# Patient Record
Sex: FEMALE | Race: WHITE
[De-identification: names, ages, dates, MRNs, and addresses within clinical notes are randomized per-mention and may not be internally consistent; named-entity substitution may affect disease eponyms.]

---

## 2018-02-09 ENCOUNTER — HOSPITAL ENCOUNTER (EMERGENCY)
Age: 73
Discharge: HOME | End: 2018-02-09

## 2018-02-09 ENCOUNTER — HOSPITAL ENCOUNTER (EMERGENCY)
Dept: HOSPITAL 91 - E/R | Age: 73
Discharge: HOME | End: 2018-02-09
Payer: COMMERCIAL

## 2018-02-09 DIAGNOSIS — I10: Primary | ICD-10-CM

## 2018-02-09 PROCEDURE — 99283 EMERGENCY DEPT VISIT LOW MDM: CPT

## 2018-02-09 RX ADMIN — NICARDIPINE HYDROCHLORIDE 1 MG: 30 CAPSULE ORAL at 14:30

## 2019-07-13 ENCOUNTER — HOSPITAL ENCOUNTER (EMERGENCY)
Dept: HOSPITAL 91 - E/R | Age: 74
Discharge: HOME | End: 2019-07-13
Payer: COMMERCIAL

## 2019-07-13 ENCOUNTER — HOSPITAL ENCOUNTER (EMERGENCY)
Dept: HOSPITAL 10 - E/R | Age: 74
Discharge: HOME | End: 2019-07-13
Payer: COMMERCIAL

## 2019-07-13 VITALS
BODY MASS INDEX: 36.18 KG/M2 | BODY MASS INDEX: 36.18 KG/M2 | HEIGHT: 65 IN | WEIGHT: 217.16 LBS | HEIGHT: 65 IN | WEIGHT: 217.16 LBS

## 2019-07-13 VITALS — SYSTOLIC BLOOD PRESSURE: 121 MMHG | HEART RATE: 99 BPM | RESPIRATION RATE: 20 BRPM | DIASTOLIC BLOOD PRESSURE: 72 MMHG

## 2019-07-13 DIAGNOSIS — I16.0: Primary | ICD-10-CM

## 2019-07-13 DIAGNOSIS — R40.2362: ICD-10-CM

## 2019-07-13 DIAGNOSIS — R40.2142: ICD-10-CM

## 2019-07-13 DIAGNOSIS — R40.2252: ICD-10-CM

## 2019-07-13 DIAGNOSIS — I10: ICD-10-CM

## 2019-07-13 LAB
ADD MAN DIFF?: NO
ANION GAP: 10 (ref 5–13)
BASOPHIL #: 0 10^3/UL (ref 0–0.1)
BASOPHILS %: 0.3 % (ref 0–2)
BLOOD UREA NITROGEN: 14 MG/DL (ref 7–20)
CALCIUM: 9.9 MG/DL (ref 8.4–10.2)
CARBON DIOXIDE: 27 MMOL/L (ref 21–31)
CHLORIDE: 106 MMOL/L (ref 97–110)
CREATININE: 0.8 MG/DL (ref 0.44–1)
EOSINOPHILS #: 0.2 10^3/UL (ref 0–0.5)
EOSINOPHILS %: 2.4 % (ref 0–7)
GLUCOSE: 114 MG/DL (ref 70–220)
HEMATOCRIT: 43 % (ref 37–47)
HEMOGLOBIN: 14.6 G/DL (ref 12–16)
IMMATURE GRANS #M: 0.02 10^3/UL (ref 0–0.03)
IMMATURE GRANS % (M): 0.2 % (ref 0–0.43)
LYMPHOCYTES #: 2.9 10^3/UL (ref 0.8–2.9)
LYMPHOCYTES %: 29.7 % (ref 15–51)
MEAN CORPUSCULAR HEMOGLOBIN: 30.7 PG (ref 29–33)
MEAN CORPUSCULAR HGB CONC: 34 G/DL (ref 32–37)
MEAN CORPUSCULAR VOLUME: 90.5 FL (ref 82–101)
MEAN PLATELET VOLUME: 11 FL (ref 7.4–10.4)
MONOCYTE #: 0.9 10^3/UL (ref 0.3–0.9)
MONOCYTES %: 8.6 % (ref 0–11)
NEUTROPHIL #: 5.8 10^3/UL (ref 1.6–7.5)
NEUTROPHILS %: 58.8 % (ref 39–77)
NUCLEATED RED BLOOD CELLS #: 0 10^3/UL (ref 0–0)
NUCLEATED RED BLOOD CELLS%: 0 /100WBC (ref 0–0)
PLATELET COUNT: 244 10^3/UL (ref 140–415)
POTASSIUM: 3.9 MMOL/L (ref 3.5–5.1)
RED BLOOD COUNT: 4.75 10^6/UL (ref 4.2–5.4)
RED CELL DISTRIBUTION WIDTH: 12.6 % (ref 11.5–14.5)
SODIUM: 143 MMOL/L (ref 135–144)
WHITE BLOOD COUNT: 9.9 10^3/UL (ref 4.8–10.8)

## 2019-07-13 PROCEDURE — 36415 COLL VENOUS BLD VENIPUNCTURE: CPT

## 2019-07-13 PROCEDURE — 80048 BASIC METABOLIC PNL TOTAL CA: CPT

## 2019-07-13 PROCEDURE — 70450 CT HEAD/BRAIN W/O DYE: CPT

## 2019-07-13 PROCEDURE — 85025 COMPLETE CBC W/AUTO DIFF WBC: CPT

## 2019-07-13 PROCEDURE — 99284 EMERGENCY DEPT VISIT MOD MDM: CPT

## 2019-07-13 RX ADMIN — NIFEDIPINE 1 MG: 30 TABLET, FILM COATED, EXTENDED RELEASE ORAL at 04:38

## 2019-07-13 NOTE — ERD
ER Documentation


Chief Complaint


Chief Complaint





htn at home, c/o headache. denies cp





HPI


The patient is a 74-year-old female, presenting to the ER because of high blood 


pressure with dizziness and headache intermittently for the last 5 days. She 


stopped taking her hypertensive medication a few months ago because she concern 


about cancer.  She denies syncope, near syncope, neck pain, chest pain, dyspnea,


abdominal pain, vomiting, dysuria.  She does not smoke nor drink, has a lot of 


stress in her life





Past medical history: Hypertension


Past surgical history: None





ROS


All systems reviewed and are negative except as per history of present illness.





Medications


Home Meds


Active Scripts


Nifedipine (Procardia Xl) 30 Mg Tab.er.24, 30 MG PO DAILY, #14 TAB


   Prov:SLAVA MELLO MD         19


Hydrochlorothiazide* (Hydrochlorothiazide*) 25 Mg Tab, 25 MG PO DAILY, #30 TAB


   Prov:YUMIKO DEUTSCH MD         18





Allergies


Allergies:  


Coded Allergies:  


     No Known Allergy (Unverified , 19)





PMhx/Soc


History of Surgery:  No


Anesthesia Reaction:  No


Hx Neurological Disorder:  No


Hx Respiratory Disorders:  No


Hx Cardiac Disorders:  No


Hx Psychiatric Problems:  No


Hx Miscellaneous Medical Probl:  No


Hx Alcohol Use:  No


Hx Substance Use:  No


Hx Tobacco Use:  No





Physical Exam


Vitals





Vital Signs


  Date      Temp  Pulse  Resp  B/P (MAP)   Pulse Ox  O2          O2 Flow    FiO2


Time                                                 Delivery    Rate


   19                         146/76


     05:18                           (99)


   19           78           225/105


     02:27                          (145)


   19  97.4     72    18     211/102        98


     00:39                          (138)





Physical Exam


 Const:      No acute distress.


 Head:        Atraumatic.


 Eyes:       Normal Conjunctiva.


 ENT:         Normal External Ears, Nose and Mouth.


 Neck:        Full range of motion.  No meningismus.


 Resp:         Clear to auscultation bilaterally.


 Cardio:       Regular rate and rhythm.


 Abd:         Soft,  non distended, normal bowel sounds, non tender.


 Skin:         No petechiae or rashes.


 Back:        No midline or flank tenderness.


 Ext:          No cyanosis, or edema.


 Neur:        Awake and alert. No focal deficit


 Psych:        Normal Mood and Affect.


Result Diagram:  


19 0414                                                                    


           19 0415





Results 24 hrs





Laboratory Tests


       Test
                                 19
04:14  19
04:15


       White Blood Count                      9.9 10^3/ul


       Red Blood Count                       4.75 10^6/ul


       Hemoglobin                               14.6 g/dl


       Hematocrit                                  43.0 %


       Mean Corpuscular Volume                    90.5 fl


       Mean Corpuscular Hemoglobin                30.7 pg


       Mean Corpuscular Hemoglobin
Concent     34.0 g/dl 
  



       Red Cell Distribution Width                 12.6 %


       Platelet Count                         244 10^3/UL


       Mean Platelet Volume                       11.0 fl


       Immature Granulocytes %                    0.200 %


       Neutrophils %                               58.8 %


       Lymphocytes %                               29.7 %


       Monocytes %                                  8.6 %


       Eosinophils %                                2.4 %


       Basophils %                                  0.3 %


       Nucleated Red Blood Cells %            0.0 /100WBC


       Immature Granulocytes #              0.020 10^3/ul


       Neutrophils #                          5.8 10^3/ul


       Lymphocytes #                          2.9 10^3/ul


       Monocytes #                            0.9 10^3/ul


       Eosinophils #                          0.2 10^3/ul


       Basophils #                            0.0 10^3/ul


       Nucleated Red Blood Cells #            0.0 10^3/ul


       Sodium Level                                           143 mmol/L


       Potassium Level                                        3.9 mmol/L


       Chloride Level                                         106 mmol/L


       Carbon Dioxide Level                                    27 mmol/L


       Anion Gap                                                      10


       Blood Urea Nitrogen                                      14 mg/dl


       Creatinine                                             0.80 mg/dl


       Est Glomerular Filtrat Rate
mL/min   
                mL/min 



       Glucose Level                                           114 mg/dl


       Calcium Level                                           9.9 mg/dl





Current Medications


 Medications
   Dose
          Sig/Wendy
       Start Time
   Status  Last


 (Trade)       Ordered        Route
 PRN     Stop Time              Admin
Dose


                              Reason                                Admin


 Nifedipine
    30 mg          ONCE  ONCE
    19       DC           19


(Procardia                    PO
            04:00
                       04:38



Xl)                                          19 04:01








[facility]ced[facility]/[place] [id number]


                        Radiology Main Line: [phone]





                            DIAGNOSTIC IMAGING REPORT





Patient: MACIEL GUDINO   : 1945   Age: 74  Sex: F                  


     


       MR #:    [id number]   Acct #:   [id number]    DOS: 19 0335


Ordering MD: SLAVA MELLO MD   Location:  E/R   Room/Bed:                    


                       


                                        


PROCEDURE:   CT Brain without contrast. 


 


CLINICAL INDICATION:    Headache, hypertension


 


TECHNIQUE:   A CT of the brain was performed utilizing axial imaging from the 


skull base through the vertex without IV contrast.  Multiplanar reformatted 


images were made.  Images were reviewed on a PACS workstation.   


 


CTDIvol: 36.62 mGy


DLP: 634.23 mGycm


 


DICOM images are available.


One or more of the following dose reduction techniques were utilized:


1.) Automated exposure control


2.) Adjustment of the mA +/- kV according to patient's size


3.) Use of iterative reconstruction technique.


 


COMPARISON:   None 


 


FINDINGS:


 


CALVARIUM: Regional bones are intact.


 


SINUSES: Paranasal sinuses and mastoid air cells are clear.


 


BRAIN: There is mild cerebral volume loss and subtle diminished attenuation 


within the periventricular white matter compatible with chronic small vessel 


ischemic changes. No evidence of an acute territorial infarction. There is no 


intracranial mass or hemorrhage. Ventricles and cisterns are unremarkable.


 


IMPRESSION:


 


1.  Negative for intracranial hemorrhage or other acute process.


2.  Generalized volume loss, mild chronic small vessel ischemic changes.


 


 


RPTAT: HJBB


_____________________________________________ 


Physician Steven           Date    Time 


Electronically viewed and signed by Physician Steven on 2019 


04:57 


 


D:  2019 04:57  T:  2019 04:57


xB/





CC: SLAVA MELLO MD





[phone]





MEDICAL MAKING DECISION: The patient is a 74-year-old female, presenting with 


acute hypertensive urgency, treated with Procardia XL 30 mg p.o. with good 


response, is stable for outpatient follow-up





The differential diagnoses considered include but are not limited to 


subarachnoid hemorrhage, occult trauma, CVA, meningitis, encephalitis, 


hypertension, tension, migraine, cluster, narcotic withdrawal, cervical spine 


disease.





Departure


Diagnosis:  


   Primary Impression:  


   Hypertensive urgency


Condition:  Good


Comments


She was discharged with Procardia XL 30 mg daily





I discussed the findings with the patient. I advised the patient to follow-up 


with the primary physician in about 1-2 days, sooner if needed and return if any


concern.





Disclaimer: Inadvertent spelling and grammatical errors are likely due to 


EHR/dictation software use and do not reflect on the overall quality of patient 


care. Also, please note that the electronic time recorded on this note does not 


necessarily reflect the actual time of the patient encounter.











SLAVA MELLO MD              2019 03:30